# Patient Record
Sex: FEMALE
[De-identification: names, ages, dates, MRNs, and addresses within clinical notes are randomized per-mention and may not be internally consistent; named-entity substitution may affect disease eponyms.]

---

## 2021-10-22 ENCOUNTER — NON-APPOINTMENT (OUTPATIENT)
Age: 51
End: 2021-10-22

## 2021-11-01 ENCOUNTER — APPOINTMENT (OUTPATIENT)
Dept: HEART AND VASCULAR | Facility: CLINIC | Age: 51
End: 2021-11-01
Payer: COMMERCIAL

## 2021-11-01 VITALS
HEIGHT: 65 IN | BODY MASS INDEX: 28.99 KG/M2 | OXYGEN SATURATION: 98 % | WEIGHT: 174 LBS | DIASTOLIC BLOOD PRESSURE: 85 MMHG | HEART RATE: 92 BPM | TEMPERATURE: 98.5 F | SYSTOLIC BLOOD PRESSURE: 125 MMHG

## 2021-11-01 DIAGNOSIS — Z81.8 FAMILY HISTORY OF OTHER MENTAL AND BEHAVIORAL DISORDERS: ICD-10-CM

## 2021-11-01 DIAGNOSIS — Z82.49 FAMILY HISTORY OF ISCHEMIC HEART DISEASE AND OTHER DISEASES OF THE CIRCULATORY SYSTEM: ICD-10-CM

## 2021-11-01 DIAGNOSIS — Z82.0 FAMILY HISTORY OF EPILEPSY AND OTHER DISEASES OF THE NERVOUS SYSTEM: ICD-10-CM

## 2021-11-01 DIAGNOSIS — Z83.438 FAMILY HISTORY OF OTHER DISORDER OF LIPOPROTEIN METABOLISM AND OTHER LIPIDEMIA: ICD-10-CM

## 2021-11-01 DIAGNOSIS — J45.20 MILD INTERMITTENT ASTHMA, UNCOMPLICATED: ICD-10-CM

## 2021-11-01 DIAGNOSIS — Z83.3 FAMILY HISTORY OF DIABETES MELLITUS: ICD-10-CM

## 2021-11-01 DIAGNOSIS — Z78.9 OTHER SPECIFIED HEALTH STATUS: ICD-10-CM

## 2021-11-01 PROBLEM — Z00.00 ENCOUNTER FOR PREVENTIVE HEALTH EXAMINATION: Status: ACTIVE | Noted: 2021-11-01

## 2021-11-01 PROCEDURE — 99204 OFFICE O/P NEW MOD 45 MIN: CPT

## 2021-11-01 RX ORDER — METOPROLOL SUCCINATE 25 MG/1
25 TABLET, EXTENDED RELEASE ORAL
Qty: 30 | Refills: 5 | Status: ACTIVE | COMMUNITY
Start: 2021-11-01 | End: 1900-01-01

## 2021-11-01 RX ORDER — FLUOCINOLONE ACETONIDE, HYDROQUINONE, AND TRETINOIN .1; 40; .5 MG/G; MG/G; MG/G
0.01-4-0.05 CREAM TOPICAL
Qty: 30 | Refills: 0 | Status: DISCONTINUED | COMMUNITY
Start: 2021-05-26

## 2021-11-01 RX ORDER — DEXTROAMPHETAMINE SULFATE, DEXTROAMPHETAMINE SACCHARATE, AMPHETAMINE SULFATE AND AMPHETAMINE ASPARTATE 5; 5; 5; 5 MG/1; MG/1; MG/1; MG/1
20 CAPSULE, EXTENDED RELEASE ORAL
Qty: 30 | Refills: 0 | Status: DISCONTINUED | COMMUNITY
Start: 2021-07-09

## 2021-11-01 RX ORDER — MUPIROCIN 20 MG/G
2 OINTMENT TOPICAL
Qty: 22 | Refills: 0 | Status: DISCONTINUED | COMMUNITY
Start: 2021-09-23

## 2021-11-01 RX ORDER — TRIAMCINOLONE ACETONIDE 1 MG/G
0.1 OINTMENT TOPICAL
Qty: 30 | Refills: 0 | Status: DISCONTINUED | COMMUNITY
Start: 2021-05-26

## 2021-11-03 ENCOUNTER — APPOINTMENT (OUTPATIENT)
Dept: CARDIOLOGY | Facility: CLINIC | Age: 51
End: 2021-11-03
Payer: COMMERCIAL

## 2021-11-03 PROCEDURE — 97802 MEDICAL NUTRITION INDIV IN: CPT | Mod: 95

## 2021-11-05 ENCOUNTER — APPOINTMENT (OUTPATIENT)
Dept: CT IMAGING | Facility: CLINIC | Age: 51
End: 2021-11-05
Payer: COMMERCIAL

## 2021-11-05 ENCOUNTER — OUTPATIENT (OUTPATIENT)
Dept: OUTPATIENT SERVICES | Facility: HOSPITAL | Age: 51
LOS: 1 days | End: 2021-11-05

## 2021-11-05 ENCOUNTER — RESULT REVIEW (OUTPATIENT)
Age: 51
End: 2021-11-05

## 2021-11-05 ENCOUNTER — TRANSCRIPTION ENCOUNTER (OUTPATIENT)
Age: 51
End: 2021-11-05

## 2021-11-05 PROCEDURE — 75574 CT ANGIO HRT W/3D IMAGE: CPT | Mod: 26

## 2021-11-08 DIAGNOSIS — J98.59 OTHER DISEASES OF MEDIASTINUM, NOT ELSEWHERE CLASSIFIED: ICD-10-CM

## 2021-11-08 DIAGNOSIS — D35.2 BENIGN NEOPLASM OF PITUITARY GLAND: ICD-10-CM

## 2021-11-12 ENCOUNTER — APPOINTMENT (OUTPATIENT)
Dept: HEART AND VASCULAR | Facility: CLINIC | Age: 51
End: 2021-11-12

## 2021-11-17 ENCOUNTER — APPOINTMENT (OUTPATIENT)
Dept: HEART AND VASCULAR | Facility: CLINIC | Age: 51
End: 2021-11-17

## 2021-12-09 NOTE — DISCUSSION/SUMMARY
[FreeTextEntry1] : 51F PMHx mild asthma, HTN (dx Feb but not on meds), HLD, Vit D def referred by Dr. Ghosh for HLD, weight mgmt. \par \par 1. HLD: With 1 episode of atypical CP,  + obesity, no fam hx of CAD.\par - will obtain CCTA; Rx toprol 25mg as patients HR is 92 and this may affect img quality; d/w patient, she will take 1 tab the night before, 1 tab in the morning \par - Dietary and exercise habits reviewed and discussed with over 50% of the visit spent discussing cardiovascular disease, the optimization of risk factors and lifestyle strategies that can be used to achieve this. Referral to Annelies[facility] Everett. \par - repeat lipids with lipoA in 3mo after lifestyle changes made; d/w patient she will need statin but will determine dosing and if asa should be added once CTA results and based off new labs. \par - will see how patients mood and sleep habits are at f/u; consider referral to behavioral health and sleep study if these things haven't improved with lifestyle changes. \par - tele visit in 1mo \par \par 2. HTN: dx at some office visits this past year, but well controlled today. \par - will continue to moitor off medications. \par \par The above was d/w Dr. Brown 
[id number]

## 2021-12-09 NOTE — PHYSICAL EXAM
[Normal] : moves all extremities, no focal deficits, normal speech [de-identified] : No xanthelasma, xanthomas

## 2021-12-09 NOTE — HISTORY OF PRESENT ILLNESS
[FreeTextEntry1] : \par 1. HLD: Dx 1mo ago. Has had 1 episode of CP x2mo ago - she's unsure of what she was doing at the time but noted sharp, left sided chest pain that lasted for 2 mins and resolved on its own. No associated SOB, lightheadedness/dizziness, LE edema. 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 \par -9/2021: , HDL 65, Tchol 267, ; A1C 5.5 \par \par 2. 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 She was not started on meds but was told it was high at multiple visits, though with Dr. Ghosh has never had high bp.  \par \par Fam hx: Dad: HTN, HLD, DM, PPM, gout, prostate issues, arthritis, dementia  \par Mom: Alzheimer's \par \par Lifestyle History:\par Mediterranean Diet Score (9 question survey) was not done. She goes to restaurants mostly (for client dinners). Eats fish mainly, not much red meat or vegetables. Fruits almost daily. Doesn't eat snacks much except for on the weekend, but then will go for a period of time without snacking. \par Exercise: Patient reports exercising at a light level for 30-60 minutes per week. Used to swim but due to frequent travel to and from [place], finds it difficult to re-establish a routine when she comes back to [place]. \par Smoking: Patient denies any history of smoking. \par Stress: Mom recently started showing signs/sx of behavioral issues related to Alzheimers. Dad was her caregiver but then began developing dementia. She had to move both parents out of nursing home d/t covid outbreak, since then moved them 3 times in the last year until finally moving them into her sisters home in [place]. She travels back and forth almost monthly to help out there. Works a lot, going through litigation with her job. 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 Does not have much coping mechanisms for stress as she uses her work outings to de-stress. \par Sleep apnea: Sleeps 4-5 hours, not well rested in am, difficulty falling asleep at night because too much is on her mind. Recently notes some daytime sleepiness - fell asleep at a zoom meeting. \par \par Women's Health: No hx of PCOS, pregnancies. Feels she is mary-menopausal (being eval by gyn with hormone levels pending).\par

## 2021-12-09 NOTE — REASON FOR VISIT
[FreeTextEntry1] : 51F PMHx mild asthma, HTN (dx Feb but not on meds), HLD, Vit D def referred by Dr. Ghosh for HLD, weight mgmt. \par

## 2021-12-10 ENCOUNTER — TRANSCRIPTION ENCOUNTER (OUTPATIENT)
Age: 51
End: 2021-12-10

## 2021-12-13 ENCOUNTER — TRANSCRIPTION ENCOUNTER (OUTPATIENT)
Age: 51
End: 2021-12-13

## 2022-01-10 ENCOUNTER — NON-APPOINTMENT (OUTPATIENT)
Age: 52
End: 2022-01-10

## 2022-01-10 ENCOUNTER — APPOINTMENT (OUTPATIENT)
Dept: HEART AND VASCULAR | Facility: CLINIC | Age: 52
End: 2022-01-10
Payer: COMMERCIAL

## 2022-01-10 VITALS
HEIGHT: 66 IN | TEMPERATURE: 97.3 F | SYSTOLIC BLOOD PRESSURE: 153 MMHG | HEART RATE: 78 BPM | DIASTOLIC BLOOD PRESSURE: 95 MMHG

## 2022-01-10 VITALS — DIASTOLIC BLOOD PRESSURE: 82 MMHG | SYSTOLIC BLOOD PRESSURE: 150 MMHG

## 2022-01-10 DIAGNOSIS — Z82.49 FAMILY HISTORY OF ISCHEMIC HEART DISEASE AND OTHER DISEASES OF THE CIRCULATORY SYSTEM: ICD-10-CM

## 2022-01-10 PROCEDURE — 93000 ELECTROCARDIOGRAM COMPLETE: CPT

## 2022-01-10 PROCEDURE — 99214 OFFICE O/P EST MOD 30 MIN: CPT

## 2022-01-10 RX ORDER — BENAZEPRIL HYDROCHLORIDE 10 MG/1
10 TABLET, FILM COATED ORAL DAILY
Qty: 30 | Refills: 5 | Status: DISCONTINUED | COMMUNITY
Start: 2022-01-10 | End: 2022-01-10

## 2022-01-10 RX ORDER — LISINOPRIL 10 MG/1
10 TABLET ORAL DAILY
Qty: 30 | Refills: 5 | Status: ACTIVE | COMMUNITY
Start: 2022-01-10 | End: 1900-01-01

## 2022-01-10 NOTE — DISCUSSION/SUMMARY
[FreeTextEntry1] : 51 F PMHx mild asthma, HTN (dx Feb but not on meds), HLD, Vit D def referred by Dr. Ghosh for HLD, weight mgmt. \par \par 1. HLD: With 1 episode of atypical CP,  + obesity, maternal grandmother had MI in early 50s\par - repeat lipids with lipoA today; d/w patient she will need statin but will determine dosing after lab results reviewed\par - will start her on anti-HTN medications today and then consider statins after she is established\par \par 2. HTN: elevated at office today, even on recheck w/ proper AHA/ACC technique\par - ordered Benazepril 10mg \par - will order BMP\par \par 3. Pancreatic cysts noted on MRI\par - gave several options for PCP as her previous retired\par - will send to Dr. Goff for her recs on f/u \par \par RTC two weeks.

## 2022-01-10 NOTE — REASON FOR VISIT
[FreeTextEntry1] : 51F PMHx mild asthma, HTN (dx Feb but not on meds), HLD, Vit D def referred by Dr. Ghosh for HLD, weight mgmt.

## 2022-01-10 NOTE — HISTORY OF PRESENT ILLNESS
[FreeTextEntry1] : 51F PMHx mild asthma, HTN (dx Feb but not on meds), HLD, Vit D def referred by Dr. Ghosh for HLD, weight mgmt. \par \par 1. HLD: Dx Oct 2021. Has had 1 episode of CP in Sept - CTA 11/5/21 showed normal coronaries. 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 \par -9/2021: , HDL 65, Tchol 267, ; A1C 5.5 \par \par 2. 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 She was not started on meds but was told it was high at multiple visits, though with Dr. Ghosh has never had high bp.  \par \par Fam hx: Dad: HTN, HLD, DM, PPM, gout, prostate issues, arthritis, dementia  \par Mom: Alzheimer's \par \par Prior History \par Lifestyle History:\par Mediterranean Diet Score (9 question survey) was not done. She goes to restaurants mostly (for client dinners). Eats fish mainly, not much red meat or vegetables. Fruits almost daily. Doesn't eat snacks much except for on the weekend, but then will go for a period of time without snacking. \par Exercise: Patient reports exercising at a light level for 30-60 minutes per week. Used to swim but due to frequent travel to and from [place], finds it difficult to re-establish a routine when she comes back to [place]. \par Smoking: Patient denies any history of smoking. \par Stress: Mom recently started showing signs/sx of behavioral issues related to Alzheimers. Dad was her caregiver but then began developing dementia. She had to move both parents out of nursing home d/t covid outbreak, since then moved them 3 times in the last year until finally moving them into her sisters home in [place]. She travels back and forth almost monthly to help out there. Works a lot, going through litigation with her job. 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 Does not have much coping mechanisms for stress as she uses her work outings to de-stress. \par Sleep apnea: Sleeps 4-5 hours, not well rested in am, difficulty falling asleep at night because too much is on her mind. Recently notes some daytime sleepiness - fell asleep at a zoom meeting. She reports having attended sleep study in the past and it was normal. \par \par Women's Health: No hx of PCOS, pregnancies. Feels she is mary-menopausal, but recent eval by gyn revealed hormone levels are normal. \par

## 2022-01-10 NOTE — PHYSICAL EXAM
[Normal] : moves all extremities, no focal deficits, normal speech [de-identified] : No xanthelasma, xanthomas

## 2022-01-11 ENCOUNTER — TRANSCRIPTION ENCOUNTER (OUTPATIENT)
Age: 52
End: 2022-01-11

## 2022-01-11 LAB
ANION GAP SERPL CALC-SCNC: 12 MMOL/L
BUN SERPL-MCNC: 7 MG/DL
CALCIUM SERPL-MCNC: 9.2 MG/DL
CHLORIDE SERPL-SCNC: 104 MMOL/L
CHOLEST SERPL-MCNC: 214 MG/DL
CO2 SERPL-SCNC: 23 MMOL/L
CREAT SERPL-MCNC: 0.66 MG/DL
GLUCOSE SERPL-MCNC: 86 MG/DL
HDLC SERPL-MCNC: 46 MG/DL
LDLC SERPL CALC-MCNC: 118 MG/DL
NONHDLC SERPL-MCNC: 168 MG/DL
POTASSIUM SERPL-SCNC: 4.3 MMOL/L
SODIUM SERPL-SCNC: 139 MMOL/L
TRIGL SERPL-MCNC: 249 MG/DL

## 2022-01-12 ENCOUNTER — TRANSCRIPTION ENCOUNTER (OUTPATIENT)
Age: 52
End: 2022-01-12

## 2022-01-12 LAB — APO LP(A) SERPL-MCNC: 45 NMOL/L

## 2022-01-14 ENCOUNTER — TRANSCRIPTION ENCOUNTER (OUTPATIENT)
Age: 52
End: 2022-01-14

## 2022-01-24 ENCOUNTER — APPOINTMENT (OUTPATIENT)
Dept: HEART AND VASCULAR | Facility: CLINIC | Age: 52
End: 2022-01-24
Payer: COMMERCIAL

## 2022-01-24 DIAGNOSIS — I10 ESSENTIAL (PRIMARY) HYPERTENSION: ICD-10-CM

## 2022-01-24 DIAGNOSIS — E78.5 HYPERLIPIDEMIA, UNSPECIFIED: ICD-10-CM

## 2022-01-24 PROCEDURE — 99213 OFFICE O/P EST LOW 20 MIN: CPT | Mod: 95

## 2022-01-24 NOTE — HISTORY OF PRESENT ILLNESS
[FreeTextEntry1] : 51F PMHx mild asthma, HTN (dx Feb but not on meds), HLD, Vit D def referred by Dr. Ghosh for HLD, weight mgmt. \par \par She had a second Shingrix vaccine and was knocked out from that, but is getting better.\par \par Walking more now. Diet changes have been intermittent but she wants to continue to improve. \par \par She has been holding off on medication for blood pressure because her preference is to improve on her own if feasible. \par \par Her BP was 125-130/80's when her dentist friend checked it for her. \par Appt with Francesca Lyon- endocrinology \par Weight is likely the same as prior at 174 lbs. \par \par Prior History on Last Visit:\par 1. HLD: Dx Oct 2021. Has had 1 episode of CP in Sept - CTA 11/5/21 showed normal coronaries. 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 \par -9/2021: , HDL 65, Tchol 267, ; A1C 5.5 \par \par 2. 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 She was not started on meds but was told it was high at multiple visits, though with Dr. Ghosh has never had high bp.  \par \par Fam hx: Dad: HTN, HLD, DM, PPM, gout, prostate issues, arthritis, dementia  \par Mom: Alzheimer's \par \par Women's Health: No hx of PCOS, pregnancies. Feels she is mary-menopausal, but recent eval by gyn revealed hormone levels are normal. \par

## 2022-09-02 NOTE — DISCUSSION/SUMMARY
[FreeTextEntry1] : 51 F PMHx mild asthma, HTN (dx Feb but not on meds), HLD, Vit D def referred by Dr. Ghosh for HLD, weight mgmt. \par \par 1. HLD: REpeat lipids on next visit in 3 months. \par \par 2. HTN: Will monitor BP on her next visit to endocrine and with us at 3 months to determine if she is at goal.  At that point, she will consider therapy. \par \par Follow-up in 3 months after she has seen endocrine as well for input.  self fed/fed by clinician cup/self fed self fed